# Patient Record
Sex: MALE | Race: WHITE | NOT HISPANIC OR LATINO | Employment: OTHER | RURAL
[De-identification: names, ages, dates, MRNs, and addresses within clinical notes are randomized per-mention and may not be internally consistent; named-entity substitution may affect disease eponyms.]

---

## 2022-05-13 ENCOUNTER — OFFICE VISIT (OUTPATIENT)
Dept: FAMILY MEDICINE CLINIC | Facility: CLINIC | Age: 77
End: 2022-05-13

## 2022-05-13 VITALS
BODY MASS INDEX: 33.05 KG/M2 | HEIGHT: 72 IN | HEART RATE: 47 BPM | SYSTOLIC BLOOD PRESSURE: 132 MMHG | OXYGEN SATURATION: 96 % | WEIGHT: 244 LBS | DIASTOLIC BLOOD PRESSURE: 78 MMHG

## 2022-05-13 DIAGNOSIS — Z12.2 ENCOUNTER FOR SCREENING FOR LUNG CANCER: ICD-10-CM

## 2022-05-13 DIAGNOSIS — F17.210 CIGARETTE NICOTINE DEPENDENCE WITHOUT COMPLICATION: ICD-10-CM

## 2022-05-13 DIAGNOSIS — Z79.899 ENCOUNTER FOR LONG-TERM (CURRENT) USE OF OTHER MEDICATIONS: ICD-10-CM

## 2022-05-13 DIAGNOSIS — N40.1 BENIGN PROSTATIC HYPERPLASIA WITH NOCTURIA: ICD-10-CM

## 2022-05-13 DIAGNOSIS — Z86.010 H/O ADENOMATOUS POLYP OF COLON: ICD-10-CM

## 2022-05-13 DIAGNOSIS — R53.83 FATIGUE, UNSPECIFIED TYPE: ICD-10-CM

## 2022-05-13 DIAGNOSIS — E55.9 VITAMIN D DEFICIENCY: ICD-10-CM

## 2022-05-13 DIAGNOSIS — F17.200 TOBACCO USE DISORDER: ICD-10-CM

## 2022-05-13 DIAGNOSIS — R73.03 PREDIABETES: ICD-10-CM

## 2022-05-13 DIAGNOSIS — Z12.5 PROSTATE CANCER SCREENING: ICD-10-CM

## 2022-05-13 DIAGNOSIS — M15.9 PRIMARY OSTEOARTHRITIS INVOLVING MULTIPLE JOINTS: ICD-10-CM

## 2022-05-13 DIAGNOSIS — G47.33 OBSTRUCTIVE SLEEP APNEA: ICD-10-CM

## 2022-05-13 DIAGNOSIS — N62 GYNECOMASTIA: ICD-10-CM

## 2022-05-13 DIAGNOSIS — H61.23 BILATERAL IMPACTED CERUMEN: ICD-10-CM

## 2022-05-13 DIAGNOSIS — R35.1 BENIGN PROSTATIC HYPERPLASIA WITH NOCTURIA: ICD-10-CM

## 2022-05-13 DIAGNOSIS — E78.2 MIXED HYPERLIPIDEMIA: Primary | ICD-10-CM

## 2022-05-13 DIAGNOSIS — D51.0 PERNICIOUS ANEMIA: ICD-10-CM

## 2022-05-13 PROBLEM — Z86.0101 H/O ADENOMATOUS POLYP OF COLON: Status: ACTIVE | Noted: 2022-05-13

## 2022-05-13 PROBLEM — M15.0 PRIMARY OSTEOARTHRITIS INVOLVING MULTIPLE JOINTS: Status: ACTIVE | Noted: 2022-05-13

## 2022-05-13 PROCEDURE — 99214 OFFICE O/P EST MOD 30 MIN: CPT | Performed by: FAMILY MEDICINE

## 2022-05-13 RX ORDER — UBIDECARENONE 100 MG
100 CAPSULE ORAL DAILY
COMMUNITY

## 2022-05-13 RX ORDER — UBIDECARENONE 75 MG
CAPSULE ORAL
COMMUNITY

## 2022-05-13 RX ORDER — ATORVASTATIN CALCIUM 20 MG/1
20 TABLET, FILM COATED ORAL DAILY
Qty: 90 TABLET | Refills: 3 | Status: SHIPPED | OUTPATIENT
Start: 2022-05-13 | End: 2022-09-26

## 2022-05-13 RX ORDER — ATORVASTATIN CALCIUM 20 MG/1
20 TABLET, FILM COATED ORAL DAILY
COMMUNITY
Start: 2022-05-09 | End: 2022-05-13 | Stop reason: SDUPTHER

## 2022-05-13 RX ORDER — ERGOCALCIFEROL 1.25 MG/1
CAPSULE ORAL
COMMUNITY

## 2022-05-14 PROBLEM — N62 GYNECOMASTIA: Status: ACTIVE | Noted: 2022-05-14

## 2022-05-15 NOTE — PROGRESS NOTES
"Chief Complaint  Hyperlipidemia    Subjective      Shaw Rodriguez presents to [facility]  History of Present Illness  Patient is here for follow-up on hyperlipidemia as well as several other medical issues.  He typically comes in once a year.  Patient says he did have a cardiac evaluation, and that everything was okay, but he does not recall having a stress test.  He was diagnosed with sleep apnea and is using his CPAP and benefiting from this.  He had COVID last December, and still gets a little bit short of breath more easily than he used to, but is getting better.  Patient also had a colonoscopy a month ago and had 5 polyps removed along with mild diverticulosis seen, and we will need to work on getting records of the cardiac evaluation and the colonoscopy.  The patient will be due for a screening CT scan of the chest in 3 months.  His extensive smoking history has been documented previously in the chart.    The only new complaint he has today is that he has some mild gynecomastia with tenderness in the breast bilaterally.  He says they really do not hurt unless pressure is applied, and he has not felt any lumps or masses, no axillary adenopathy, no skin changes and no nipple discharge.    Objective   Vital Signs:   Vitals:    05/13/22 1328   BP: 132/78   BP Location: Left arm   Patient Position: Sitting   Cuff Size: Large Adult   Pulse: (!) 47   SpO2: 96%   Weight: 111 kg (244 lb)   Height: 182.9 cm (72\")      /78 (BP Location: Left arm, Patient Position: Sitting, Cuff Size: Large Adult)   Pulse (!) 47   Ht 182.9 cm (72\")   Wt 111 kg (244 lb)   SpO2 96%   BMI 33.09 kg/m²     Body mass index is 33.09 kg/m².    Review of Systems   Constitutional: Negative for chills, diaphoresis, fever, unexpected weight gain and unexpected weight loss.   HENT: Negative for ear discharge, ear pain, mouth sores, nosebleeds, rhinorrhea, sinus pressure, sore throat, swollen glands, trouble " swallowing and voice change.    Eyes: Negative for blurred vision, double vision, pain, redness and visual disturbance.   Respiratory: Negative for cough, chest tightness, shortness of breath (Mild with exertion), wheezing and stridor.    Cardiovascular: Negative for chest pain, palpitations and leg swelling.        PND, orthopnea   Gastrointestinal: Negative for abdominal distention, abdominal pain, anal bleeding, blood in stool, constipation, diarrhea, nausea, rectal pain, vomiting, GERD and indigestion.        Dysphagia, odynophagia   Endocrine: Negative for polydipsia, polyphagia and polyuria.   Genitourinary: Positive for nocturia. Negative for breast discharge, dysuria, hematuria and urinary incontinence.   Musculoskeletal: Negative for arthralgias (unusual/atypica), back pain, gait problem, joint swelling, myalgias and neck pain.   Skin: Negative for rash, skin lesions (worrisome/suspicious) and bruise.   Allergic/Immunologic: Negative for food allergies.   Neurological: Negative for dizziness, tremors, seizures, syncope, facial asymmetry, speech difficulty, weakness, light-headedness, numbness, headache and memory problem.   Hematological: Negative for adenopathy. Does not bruise/bleed easily.   Psychiatric/Behavioral: Negative for suicidal ideas, depressed mood and stress. The patient is not nervous/anxious.        Past History:  Medical History: has a past medical history of Actinic keratosis, Allergic rhinitis, Appendicitis, B12 deficiency, Basal cell carcinoma, Colon polyp, Cystic dysplasia of one kidney, Degenerative joint disease involving multiple joints, Hepatitis B, Hyperplastic colon polyp (2005), Mixed hyperlipidemia, Nasal polyps, Nicotine dependence, Obstructive sleep apnea, On long term drug therapy, Onychomycosis, Phlebitis (2004), Prediabetes, Right medial knee pain, Thromboembolic EVENT (HCC), Tinea pedis, Varicose veins of both lower extremities, and Vitamin D deficiency.   Surgical  History: has a past surgical history that includes Hernia repair (Left); Colonoscopy (2005); Polypectomy (08/2017); Excision basal cell carcinoma; Appendectomy; Breast fibroadenoma surgery; Vasectomy; Other surgical history; and Colonoscopy w/ biopsies and polypectomy.   Family History: family history includes Alzheimer's disease in his mother; Asthma in his daughter; Coronary artery disease in his mother; Diabetes in his mother; Heart attack in his father; Hyperlipidemia in his brother; Hypertension in his mother; Peripheral vascular disease in his mother.   Social History: reports that he quit smoking about 2 years ago. His smoking use included cigars and pipe. He has never used smokeless tobacco. He reports that he does not drink alcohol and does not use drugs.      Current Outpatient Medications:   •  atorvastatin (LIPITOR) 20 MG tablet, Take 1 tablet by mouth Daily., Disp: 90 tablet, Rfl: 3  •  coenzyme Q10 100 MG capsule, Take 100 mg by mouth Daily., Disp: , Rfl:   •  ergocalciferol (ERGOCALCIFEROL) 1.25 MG (16321 UT) capsule, Vitamin D2 1,250 mcg (50,000 unit) capsule, Disp: , Rfl:   •  vitamin B-12 (CYANOCOBALAMIN) 100 MCG tablet, Vitamin B12, Disp: , Rfl:     Allergies: Patient has no known allergies.    Physical Exam  Constitutional:       General: He is not in acute distress.     Appearance: He is obese. He is not toxic-appearing.   HENT:      Head: Normocephalic and atraumatic.      Right Ear: Tympanic membrane, ear canal and external ear normal. There is impacted cerumen.      Left Ear: Tympanic membrane, ear canal and external ear normal. There is impacted cerumen.      Nose: Nose normal. No rhinorrhea.      Mouth/Throat:      Mouth: Mucous membranes are moist.      Pharynx: Oropharynx is clear. No posterior oropharyngeal erythema.   Eyes:      General: No scleral icterus.     Extraocular Movements: Extraocular movements intact.      Conjunctiva/sclera: Conjunctivae normal.      Pupils: Pupils are  equal, round, and reactive to light.   Neck:      Vascular: No carotid bruit.   Cardiovascular:      Rate and Rhythm: Normal rate and regular rhythm.      Pulses: Normal pulses.      Heart sounds: Normal heart sounds. No murmur heard.    No gallop.   Pulmonary:      Effort: Pulmonary effort is normal.      Breath sounds: Normal breath sounds. No wheezing, rhonchi or rales.   Chest:      Chest wall: No tenderness.   Breasts:      Right: Swelling and tenderness present. No bleeding, inverted nipple, mass, nipple discharge, skin change or axillary adenopathy.      Left: Swelling and tenderness present. No bleeding, inverted nipple, mass, nipple discharge, skin change or axillary adenopathy.        Comments: Evidence of mild gynecomastia bilaterally with some tenderness  Abdominal:      General: Bowel sounds are normal. There is no distension.      Palpations: Abdomen is soft. There is no mass.      Tenderness: There is no abdominal tenderness. There is no guarding or rebound.   Musculoskeletal:         General: No swelling, tenderness or deformity. Normal range of motion.      Cervical back: No rigidity.      Right lower leg: No edema.      Left lower leg: No edema.   Lymphadenopathy:      Cervical: No cervical adenopathy.      Upper Body:      Right upper body: No axillary or pectoral adenopathy.      Left upper body: No axillary or pectoral adenopathy.   Skin:     General: Skin is warm and dry.      Capillary Refill: Capillary refill takes less than 2 seconds.      Coloration: Skin is not pale.      Findings: No erythema or rash.   Neurological:      General: No focal deficit present.      Mental Status: He is alert and oriented to person, place, and time.      Cranial Nerves: No cranial nerve deficit.      Motor: No weakness.      Coordination: Coordination normal.      Gait: Gait normal.   Psychiatric:         Mood and Affect: Mood normal.         Behavior: Behavior normal.         Thought Content: Thought content  normal.         Judgment: Judgment normal.          Result Review :                  Assessment and Plan   Diagnoses and all orders for this visit:    1. Mixed hyperlipidemia (Primary)  -     Lipid Panel; Future  Patient will continue current medications, and no changes in his therapy are needed at this time.  He will will be scheduled for his screening CT of the chest on or after September 1 of this year.  We will work on getting records from cardiology and Dr. Mane who did his colonoscopy.  If he genuinely did not have any type of stress test, and he continues to note shortness of breath with exertion, then he should contact a cardiologist and go back and insist on this test being done.  I did praise him for good compliance with his CPAP, and he is benefiting from this.  2. Encounter for long-term (current) use of other medications  -     CBC Auto Differential; Future  -     Comprehensive Metabolic Panel; Future    3. Fatigue, unspecified type  -     TSH; Future    4. Tobacco use disorder    5. Cigarette nicotine dependence without complication  -     CT Chest Low Dose Wo; Future    6. Encounter for screening for lung cancer    7. H/O adenomatous polyp of colon    8. Primary osteoarthritis involving multiple joints    9. Pernicious anemia  -     Vitamin B12; Future    10. Benign prostatic hyperplasia with nocturia    11. Prediabetes  -     Hemoglobin A1c; Future    12. Prostate cancer screening  -     PSA Screen; Future    13. Vitamin D deficiency  -     Vitamin D 25 Hydroxy; Future    14. Bilateral impacted cerumen  Ears were irrigated to clear  15. Obstructive sleep apnea    16. Gynecomastia  I did explain to the patient that breast tenderness is usually an indication for doing diagnostic mammography.  However, it is fairly common for men his age to have some degree of gynecomastia.  Therefore, he declines to do the mammography at this time, and promises that he will continue to self monitor and let me know  if any thing worrisome or suspicious develops.  He insists that the discomfort is only present when there is palpation of the breast tissue, but if it worsens or changes he will let me know.  He will also watch for any adenopathy.  If all goes well I will see him back in 1 year or sooner if needed  Other orders  -     atorvastatin (LIPITOR) 20 MG tablet; Take 1 tablet by mouth Daily.  Dispense: 90 tablet; Refill: 3                 Follow Up   Return in about 1 year (around 5/13/2023) for Recheck.  Patient was given instructions and counseling regarding his condition or for health maintenance advice. Please see specific information pulled into the AVS if appropriate.     Bakari Monge MD

## 2022-06-30 ENCOUNTER — LAB (OUTPATIENT)
Dept: FAMILY MEDICINE CLINIC | Facility: CLINIC | Age: 77
End: 2022-06-30

## 2022-06-30 DIAGNOSIS — R73.03 PREDIABETES: ICD-10-CM

## 2022-06-30 DIAGNOSIS — Z12.5 PROSTATE CANCER SCREENING: ICD-10-CM

## 2022-06-30 DIAGNOSIS — Z79.899 ENCOUNTER FOR LONG-TERM (CURRENT) USE OF OTHER MEDICATIONS: ICD-10-CM

## 2022-06-30 DIAGNOSIS — R53.83 FATIGUE, UNSPECIFIED TYPE: ICD-10-CM

## 2022-06-30 DIAGNOSIS — D51.0 PERNICIOUS ANEMIA: ICD-10-CM

## 2022-06-30 DIAGNOSIS — E55.9 VITAMIN D DEFICIENCY: ICD-10-CM

## 2022-06-30 DIAGNOSIS — E78.2 MIXED HYPERLIPIDEMIA: ICD-10-CM

## 2022-06-30 PROCEDURE — 36415 COLL VENOUS BLD VENIPUNCTURE: CPT | Performed by: FAMILY MEDICINE

## 2022-07-01 LAB
25(OH)D3+25(OH)D2 SERPL-MCNC: 29.9 NG/ML (ref 30–100)
ALBUMIN SERPL-MCNC: 4.1 G/DL (ref 3.7–4.7)
ALBUMIN/GLOB SERPL: 1.7 {RATIO} (ref 1.2–2.2)
ALP SERPL-CCNC: 95 IU/L (ref 44–121)
ALT SERPL-CCNC: 23 IU/L (ref 0–44)
AST SERPL-CCNC: 19 IU/L (ref 0–40)
BASOPHILS # BLD AUTO: 0.1 X10E3/UL (ref 0–0.2)
BASOPHILS NFR BLD AUTO: 1 %
BILIRUB SERPL-MCNC: 1 MG/DL (ref 0–1.2)
BUN SERPL-MCNC: 13 MG/DL (ref 8–27)
BUN/CREAT SERPL: 12 (ref 10–24)
CALCIUM SERPL-MCNC: 10.2 MG/DL (ref 8.6–10.2)
CHLORIDE SERPL-SCNC: 106 MMOL/L (ref 96–106)
CHOLEST SERPL-MCNC: 138 MG/DL (ref 100–199)
CO2 SERPL-SCNC: 23 MMOL/L (ref 20–29)
CREAT SERPL-MCNC: 1.06 MG/DL (ref 0.76–1.27)
EGFRCR SERPLBLD CKD-EPI 2021: 73 ML/MIN/1.73
EOSINOPHIL # BLD AUTO: 0.2 X10E3/UL (ref 0–0.4)
EOSINOPHIL NFR BLD AUTO: 3 %
ERYTHROCYTE [DISTWIDTH] IN BLOOD BY AUTOMATED COUNT: 11.6 % (ref 11.6–15.4)
GLOBULIN SER CALC-MCNC: 2.4 G/DL (ref 1.5–4.5)
GLUCOSE SERPL-MCNC: 86 MG/DL (ref 65–99)
HBA1C MFR BLD: 5.8 % (ref 4.8–5.6)
HCT VFR BLD AUTO: 44.4 % (ref 37.5–51)
HDLC SERPL-MCNC: 45 MG/DL
HGB BLD-MCNC: 14.7 G/DL (ref 13–17.7)
IMM GRANULOCYTES # BLD AUTO: 0 X10E3/UL (ref 0–0.1)
IMM GRANULOCYTES NFR BLD AUTO: 1 %
LDLC SERPL CALC-MCNC: 73 MG/DL (ref 0–99)
LYMPHOCYTES # BLD AUTO: 1.7 X10E3/UL (ref 0.7–3.1)
LYMPHOCYTES NFR BLD AUTO: 28 %
MCH RBC QN AUTO: 32 PG (ref 26.6–33)
MCHC RBC AUTO-ENTMCNC: 33.1 G/DL (ref 31.5–35.7)
MCV RBC AUTO: 97 FL (ref 79–97)
MONOCYTES # BLD AUTO: 0.6 X10E3/UL (ref 0.1–0.9)
MONOCYTES NFR BLD AUTO: 10 %
NEUTROPHILS # BLD AUTO: 3.5 X10E3/UL (ref 1.4–7)
NEUTROPHILS NFR BLD AUTO: 57 %
PLATELET # BLD AUTO: 209 X10E3/UL (ref 150–450)
POTASSIUM SERPL-SCNC: 4.6 MMOL/L (ref 3.5–5.2)
PROT SERPL-MCNC: 6.5 G/DL (ref 6–8.5)
PSA SERPL-MCNC: 2.5 NG/ML (ref 0–4)
RBC # BLD AUTO: 4.59 X10E6/UL (ref 4.14–5.8)
SODIUM SERPL-SCNC: 141 MMOL/L (ref 134–144)
TRIGL SERPL-MCNC: 108 MG/DL (ref 0–149)
TSH SERPL DL<=0.005 MIU/L-ACNC: 2.51 UIU/ML (ref 0.45–4.5)
VIT B12 SERPL-MCNC: 1520 PG/ML (ref 232–1245)
VLDLC SERPL CALC-MCNC: 20 MG/DL (ref 5–40)
WBC # BLD AUTO: 6 X10E3/UL (ref 3.4–10.8)

## 2022-09-26 RX ORDER — ATORVASTATIN CALCIUM 20 MG/1
TABLET, FILM COATED ORAL
Qty: 90 TABLET | Refills: 2 | Status: SHIPPED | OUTPATIENT
Start: 2022-09-26

## 2023-09-25 ENCOUNTER — OFFICE VISIT (OUTPATIENT)
Dept: FAMILY MEDICINE CLINIC | Facility: CLINIC | Age: 78
End: 2023-09-25
Payer: MEDICARE

## 2023-09-25 ENCOUNTER — TELEPHONE (OUTPATIENT)
Dept: FAMILY MEDICINE CLINIC | Facility: CLINIC | Age: 78
End: 2023-09-25

## 2023-09-25 VITALS
HEART RATE: 74 BPM | BODY MASS INDEX: 33.46 KG/M2 | SYSTOLIC BLOOD PRESSURE: 136 MMHG | OXYGEN SATURATION: 99 % | WEIGHT: 247 LBS | DIASTOLIC BLOOD PRESSURE: 86 MMHG | HEIGHT: 72 IN

## 2023-09-25 DIAGNOSIS — F17.200 TOBACCO USE DISORDER: ICD-10-CM

## 2023-09-25 DIAGNOSIS — R03.0 ELEVATED BLOOD PRESSURE READING WITHOUT DIAGNOSIS OF HYPERTENSION: ICD-10-CM

## 2023-09-25 DIAGNOSIS — R35.1 BENIGN PROSTATIC HYPERPLASIA WITH NOCTURIA: ICD-10-CM

## 2023-09-25 DIAGNOSIS — G47.33 OBSTRUCTIVE SLEEP APNEA: ICD-10-CM

## 2023-09-25 DIAGNOSIS — Z86.010 H/O ADENOMATOUS POLYP OF COLON: ICD-10-CM

## 2023-09-25 DIAGNOSIS — E78.2 MIXED HYPERLIPIDEMIA: ICD-10-CM

## 2023-09-25 DIAGNOSIS — R53.83 OTHER FATIGUE: ICD-10-CM

## 2023-09-25 DIAGNOSIS — D51.0 PERNICIOUS ANEMIA: ICD-10-CM

## 2023-09-25 DIAGNOSIS — Z11.59 NEED FOR HEPATITIS C SCREENING TEST: ICD-10-CM

## 2023-09-25 DIAGNOSIS — Z00.01 ENCOUNTER FOR GENERAL ADULT MEDICAL EXAMINATION WITH ABNORMAL FINDINGS: Primary | ICD-10-CM

## 2023-09-25 DIAGNOSIS — M15.9 PRIMARY OSTEOARTHRITIS INVOLVING MULTIPLE JOINTS: ICD-10-CM

## 2023-09-25 DIAGNOSIS — R73.03 PREDIABETES: ICD-10-CM

## 2023-09-25 DIAGNOSIS — Z12.5 PROSTATE CANCER SCREENING: ICD-10-CM

## 2023-09-25 DIAGNOSIS — Z79.899 ENCOUNTER FOR LONG-TERM (CURRENT) USE OF OTHER MEDICATIONS: ICD-10-CM

## 2023-09-25 DIAGNOSIS — E55.9 VITAMIN D DEFICIENCY: ICD-10-CM

## 2023-09-25 DIAGNOSIS — F17.210 CIGARETTE NICOTINE DEPENDENCE WITHOUT COMPLICATION: ICD-10-CM

## 2023-09-25 DIAGNOSIS — N40.1 BENIGN PROSTATIC HYPERPLASIA WITH NOCTURIA: ICD-10-CM

## 2023-09-25 RX ORDER — ATORVASTATIN CALCIUM 20 MG/1
20 TABLET, FILM COATED ORAL DAILY
Qty: 90 TABLET | Refills: 3 | Status: SHIPPED | OUTPATIENT
Start: 2023-09-25

## 2023-09-25 NOTE — PROGRESS NOTES
The ABCs of the Annual Wellness Visit  Subsequent Medicare Wellness Visit    Subjective    Shaw Rodriguez is a 77 y.o. male who presents for a Subsequent Medicare Wellness Visit.    The following portions of the patient's history were reviewed and   updated as appropriate: allergies, current medications, past family history, past medical history, past social history, past surgical history, and problem list.    Compared to one year ago, the patient feels his physical   health is the same.    Compared to one year ago, the patient feels his mental   health is the same.    Recent Hospitalizations:  He was not admitted to the hospital during the last year.       Current Medical Providers:  Patient Care Team:  Bakari Monge MD as PCP - General (Family Medicine)    Outpatient Medications Prior to Visit   Medication Sig Dispense Refill    coenzyme Q10 100 MG capsule Take 1 capsule by mouth Daily.      ergocalciferol (ERGOCALCIFEROL) 1.25 MG (00409 UT) capsule Vitamin D2 1,250 mcg (50,000 unit) capsule      vitamin B-12 (CYANOCOBALAMIN) 100 MCG tablet Vitamin B12      atorvastatin (LIPITOR) 20 MG tablet TAKE 1 TABLET BY MOUTH EVERY DAY 90 tablet 2     No facility-administered medications prior to visit.       No opioid medication identified on active medication list. I have reviewed chart for other potential  high risk medication/s and harmful drug interactions in the elderly.        Aspirin is not on active medication list.  Aspirin use is not indicated based on review of current medical condition/s. Risk of harm outweighs potential benefits.  .    Patient Active Problem List   Diagnosis    Mixed hyperlipidemia    Encounter for long-term (current) use of other medications    Tobacco use disorder    Cigarette nicotine dependence without complication    H/O adenomatous polyp of colon    Primary osteoarthritis involving multiple joints    Pernicious anemia    Benign prostatic hyperplasia with nocturia    Prediabetes     "Obstructive sleep apnea    Gynecomastia     Advance Care Planning   Advance Care Planning     Advance Directive is not on file.  ACP discussion was held with the patient during this visit. Patient does not have an advance directive, information provided.     Objective    Vitals:    23 1331 23 1427   BP: 150/88 136/86   BP Location: Left arm Left arm   Patient Position: Sitting Sitting   Cuff Size: Adult Adult   Pulse: 74    SpO2: 99%    Weight: 112 kg (247 lb)    Height: 182.9 cm (72\")      Estimated body mass index is 33.5 kg/m² as calculated from the following:    Height as of this encounter: 182.9 cm (72\").    Weight as of this encounter: 112 kg (247 lb).    BMI is >= 30 and <35. (Class 1 Obesity). The following options were offered after discussion;: weight loss educational material (shared in after visit summary)      Does the patient have evidence of cognitive impairment? No          HEALTH RISK ASSESSMENT    Smoking Status:  Social History     Tobacco Use   Smoking Status Former    Types: Cigars, Pipe    Quit date:     Years since quitting: 3.7   Smokeless Tobacco Never     Alcohol Consumption:  Social History     Substance and Sexual Activity   Alcohol Use Never     Fall Risk Screen:    STEADI Fall Risk Assessment was completed, and patient is at MODERATE risk for falls. Assessment completed on:2023    Depression Screenin/25/2023     1:35 PM   PHQ-2/PHQ-9 Depression Screening   Little Interest or Pleasure in Doing Things 0-->not at all   Feeling Down, Depressed or Hopeless 0-->not at all   PHQ-9: Brief Depression Severity Measure Score 0       Health Habits and Functional and Cognitive Screenin/25/2023     1:32 PM   Functional & Cognitive Status   Do you have difficulty preparing food and eating? No   Do you have difficulty bathing yourself, getting dressed or grooming yourself? No   Do you have difficulty using the toilet? No   Do you have difficulty moving around from " place to place? No   Do you have trouble with steps or getting out of a bed or a chair? Yes   Current Diet Well Balanced Diet   Dental Exam Up to date   Eye Exam Up to date   Exercise (times per week) 7 times per week   Current Exercises Include Walking   Do you need help using the phone?  No   Are you deaf or do you have serious difficulty hearing?  Yes   Do you need help to go to places out of walking distance? No   Do you need help shopping? No   Do you need help preparing meals?  No   Do you need help with housework?  No   Do you need help with laundry? No   Do you need help taking your medications? No   Do you need help managing money? No   Do you ever drive or ride in a car without wearing a seat belt? No   Have you felt unusual stress, anger or loneliness in the last month? No   Who do you live with? Spouse   If you need help, do you have trouble finding someone available to you? No   Have you been bothered in the last four weeks by sexual problems? No   Do you have difficulty concentrating, remembering or making decisions? No       Age-appropriate Screening Schedule:  Refer to the list below for future screening recommendations based on patient's age, sex and/or medical conditions. Orders for these recommended tests are listed in the plan section. The patient has been provided with a written plan.    Health Maintenance   Topic Date Due    BMI FOLLOWUP  Never done    Pneumococcal Vaccine 65+ (1 - PCV) 11/02/1951    ZOSTER VACCINE (1 of 2) Never done    TDAP/TD VACCINES (2 - Tdap) 01/26/2015    COVID-19 Vaccine (4 - Moderna series) 01/03/2022    HEPATITIS C SCREENING  Never done    ANNUAL WELLNESS VISIT  05/05/2022    LIPID PANEL  06/30/2023    INFLUENZA VACCINE  10/01/2023    COLORECTAL CANCER SCREENING  Discontinued                  CMS Preventative Services Quick Reference  Risk Factors Identified During Encounter  None Identified  The above risks/problems have been discussed with the patient.  Pertinent  information has been shared with the patient in the After Visit Summary.  An After Visit Summary and PPPS were made available to the patient.    Follow Up:   Next Medicare Wellness visit to be scheduled in 1 year.       Additional E&M Note during same encounter follows:  Patient has multiple medical problems which are significant and separately identifiable that require additional work above and beyond the Medicare Wellness Visit.      Chief Complaint  Medicare Wellness-subsequent    Subjective        HPI  Shaw Rodriguez is also being seen today for checkup on hyperlipidemia and a few other issues. Pt has been feeling well overall. He did have a URI a few weeks ago that felt very similar to previous COVID illness, but has recovered fully with no sequellae. He has had COVID vaccine x 3 and we did discuss importance of getting yearly booster, along with RSV vaccine ,Zoster vaccine, and prevnar 20 and high dose flu shot which he is getting here today. Pt quit smoking in late 201`9 or early 2020 so he is due for lung cancer screening again, last one was 6/2022, no sx of lung CA. Pt is using CPAP regularly and benefits from this. Has not had any CV sx lately with exertion, though he did have a bit of brief chest discomfort upon lying down a couple of weeks ago, but no symptoms with walking 1.5 miles a day, no GERD sx or swallowing problems. Cardiac workup about 1.5yrs ago was normal.     Review of Systems   Constitutional:  Negative for activity change, appetite change, chills and fever.   HENT:  Negative for sore throat and trouble swallowing.    Eyes:  Negative for visual disturbance.   Respiratory:  Negative for cough, choking, shortness of breath and wheezing.    Cardiovascular:  Negative for chest pain, palpitations and leg swelling.   Gastrointestinal:  Negative for abdominal pain, blood in stool, constipation, diarrhea and vomiting.   Endocrine: Negative for polydipsia, polyphagia and polyuria.   Genitourinary:   "Negative for dysuria and hematuria.   Musculoskeletal:  Negative for arthralgias, back pain, gait problem and joint swelling.   Skin:  Negative for rash.   Allergic/Immunologic: Positive for environmental allergies.   Neurological:  Negative for tremors, seizures, syncope, speech difficulty, weakness and light-headedness.   Hematological:  Negative for adenopathy.   Psychiatric/Behavioral:  Negative for dysphoric mood. The patient is not nervous/anxious.      Objective   Vital Signs:  /86 (BP Location: Left arm, Patient Position: Sitting, Cuff Size: Adult)   Pulse 74   Ht 182.9 cm (72\")   Wt 112 kg (247 lb)   SpO2 99%   BMI 33.50 kg/m²     Physical Exam  Constitutional:       General: He is not in acute distress.     Appearance: He is obese. He is not toxic-appearing.   HENT:      Head: Normocephalic and atraumatic.      Right Ear: Ear canal and external ear normal.      Left Ear: Ear canal and external ear normal.      Nose: Nose normal.      Mouth/Throat:      Mouth: Mucous membranes are moist.      Pharynx: Oropharynx is clear.   Eyes:      General: No scleral icterus.     Extraocular Movements: Extraocular movements intact.      Conjunctiva/sclera: Conjunctivae normal.      Pupils: Pupils are equal, round, and reactive to light.   Neck:      Vascular: No carotid bruit.   Cardiovascular:      Rate and Rhythm: Normal rate and regular rhythm.      Pulses: Normal pulses.      Heart sounds: Normal heart sounds.   Pulmonary:      Effort: Pulmonary effort is normal.      Breath sounds: Normal breath sounds.   Chest:      Chest wall: No tenderness.   Abdominal:      General: Bowel sounds are normal. There is no distension.      Palpations: Abdomen is soft. There is no mass.      Tenderness: There is no abdominal tenderness. There is no guarding or rebound.   Musculoskeletal:         General: No swelling or deformity. Normal range of motion.      Cervical back: Normal range of motion. No rigidity.      Right " lower leg: No edema.      Left lower leg: No edema.   Lymphadenopathy:      Cervical: No cervical adenopathy.   Skin:     General: Skin is warm and dry.      Capillary Refill: Capillary refill takes less than 2 seconds.      Coloration: Skin is not pale.      Findings: No erythema or rash.   Neurological:      General: No focal deficit present.      Mental Status: He is alert and oriented to person, place, and time.      Cranial Nerves: No cranial nerve deficit.      Motor: No weakness.      Coordination: Coordination normal.      Gait: Gait normal.   Psychiatric:         Mood and Affect: Mood normal.         Behavior: Behavior normal.         Thought Content: Thought content normal.         Judgment: Judgment normal.                       Assessment and Plan   Diagnoses and all orders for this visit:    1. Encounter for general adult medical examination with abnormal findings (Primary)  This was diagnosis used for the wellness portion of the visit  2. Mixed hyperlipidemia  -     Lipid Panel  Patient will continue atorvastatin and we will check labs  3. Encounter for long-term (current) use of other medications  -     CBC & Differential  -     Comprehensive Metabolic Panel    4. Tobacco use disorder  -     CT Chest Low Dose Wo; Future  Patient praised for remaining off of tobacco for the last 3 and half years and we will get low-dose CT scheduled  5. Cigarette nicotine dependence without complication  -     CT Chest Low Dose Wo; Future    6. H/O adenomatous polyp of colon  Last colonoscopy reported early 2022, but unfortunately we never got the report, so we will obtain and help patient make sure that he has a timely follow-up, which will probably be due in 2015 since he had 5 polyps (but I am awaiting report and pathology)  7. Primary osteoarthritis involving multiple joints    8. Pernicious anemia  -     Vitamin B12  -     Folate    9. Benign prostatic hyperplasia with nocturia    10. Obstructive sleep  apnea  Patient is using his CPAP and benefits from this  11. Prediabetes  -     Hemoglobin A1c  Patient eats appropriately and exercises daily but unfortunately has not been able to lose any weight.  We discussed that most patients with prediabetes have insulin resistance and that makes weight loss more difficult.  He does not wish to take any additional medication at this time but will continue making efforts at living a healthy lifestyle, and if his weight becomes more problematic and he decides to try some weight loss medications he will let me know  12. Need for hepatitis C screening test  -     Hepatitis C Antibody    13. Prostate cancer screening  -     PSA Screen    14. Vitamin D deficiency  -     Vitamin D,25-Hydroxy    15. Elevated blood pressure reading without diagnosis of hypertension  Blood pressure has historically been good, so he will monitor at home at least 2-3 times a month and if it goes to 140/90 or higher consistently he will let me know  16. Other fatigue  -     TSH+Free T4  Patient will be given high-dose flu vaccine and Prevnar 20 today, and he will get the RSV vaccine at the pharmacy in a couple of weeks, and I recommended that he get the COVID booster and the Shingrix vaccines at the pharmacy as well  Other orders  -     Fluzone High-Dose 65+yrs (4808-3532)  -     Pneumococcal Conjugate Vaccine 20-Valent (PCV20)  -     RSVPreF3 Vac Recomb Adjuvanted (AREXVY) 120 MCG/0.5ML reconstituted suspension injection; Inject 0.5 mL into the appropriate muscle as directed by prescriber 1 (One) Time for 1 dose.  Dispense: 0.5 mL; Refill: 0  -     atorvastatin (LIPITOR) 20 MG tablet; Take 1 tablet by mouth Daily.  Dispense: 90 tablet; Refill: 3             Follow Up   Return in about 1 year (around 9/25/2024) for Recheck, Nurse - AWV Subsequent.  Patient was given instructions and counseling regarding his condition or for health maintenance advice. Please see specific information pulled into the AVS if  appropriate.

## 2023-09-25 NOTE — TELEPHONE ENCOUNTER
Pt had colonoscopy with someone at [facility] office in [place] last year, at [facility] but we never got report, and need that report please + pathology on polyps, AND need to know when next scope is due (he had 5 polyps, so usually it's 3 years, but depends on pathology report from polyps)

## 2023-09-25 NOTE — TELEPHONE ENCOUNTER
Pt had colonoscopy with someone at [facility] in [place], [place] [facility] [place] but we never got report, and need that report please + pathology on polyps, AND need to know when next scope is due (he had 5 polyps, so usually it's 3 years, but depends on pathology report from polyps)      Dr. Monge last colonscopy was done 08/15/2017 was not complete  have path report attached in to do box

## 2023-09-26 DIAGNOSIS — Z79.899 ENCOUNTER FOR LONG-TERM (CURRENT) USE OF OTHER MEDICATIONS: ICD-10-CM

## 2023-09-26 DIAGNOSIS — R97.20 ELEVATED PSA: Primary | ICD-10-CM

## 2023-09-26 LAB
25(OH)D3+25(OH)D2 SERPL-MCNC: 29.4 NG/ML (ref 30–100)
ALBUMIN SERPL-MCNC: 4.3 G/DL (ref 3.8–4.8)
ALBUMIN/GLOB SERPL: 2 {RATIO} (ref 1.2–2.2)
ALP SERPL-CCNC: 81 IU/L (ref 44–121)
ALT SERPL-CCNC: 31 IU/L (ref 0–44)
AST SERPL-CCNC: 24 IU/L (ref 0–40)
BASOPHILS # BLD AUTO: 0.1 X10E3/UL (ref 0–0.2)
BASOPHILS NFR BLD AUTO: 1 %
BILIRUB SERPL-MCNC: 1.4 MG/DL (ref 0–1.2)
BUN SERPL-MCNC: 17 MG/DL (ref 8–27)
BUN/CREAT SERPL: 16 (ref 10–24)
CALCIUM SERPL-MCNC: 10.4 MG/DL (ref 8.6–10.2)
CHLORIDE SERPL-SCNC: 104 MMOL/L (ref 96–106)
CHOLEST SERPL-MCNC: 135 MG/DL (ref 100–199)
CO2 SERPL-SCNC: 24 MMOL/L (ref 20–29)
CREAT SERPL-MCNC: 1.05 MG/DL (ref 0.76–1.27)
EGFRCR SERPLBLD CKD-EPI 2021: 74 ML/MIN/1.73
EOSINOPHIL # BLD AUTO: 0.1 X10E3/UL (ref 0–0.4)
EOSINOPHIL NFR BLD AUTO: 2 %
ERYTHROCYTE [DISTWIDTH] IN BLOOD BY AUTOMATED COUNT: 11.5 % (ref 11.6–15.4)
FOLATE SERPL-MCNC: 8.9 NG/ML
GLOBULIN SER CALC-MCNC: 2.2 G/DL (ref 1.5–4.5)
GLUCOSE SERPL-MCNC: 84 MG/DL (ref 70–99)
HBA1C MFR BLD: 6 % (ref 4.8–5.6)
HCT VFR BLD AUTO: 44.3 % (ref 37.5–51)
HCV IGG SERPL QL IA: NON REACTIVE
HDLC SERPL-MCNC: 41 MG/DL
HGB BLD-MCNC: 15.3 G/DL (ref 13–17.7)
IMM GRANULOCYTES # BLD AUTO: 0 X10E3/UL (ref 0–0.1)
IMM GRANULOCYTES NFR BLD AUTO: 0 %
LDLC SERPL CALC-MCNC: 76 MG/DL (ref 0–99)
LYMPHOCYTES # BLD AUTO: 1.6 X10E3/UL (ref 0.7–3.1)
LYMPHOCYTES NFR BLD AUTO: 26 %
MCH RBC QN AUTO: 33.3 PG (ref 26.6–33)
MCHC RBC AUTO-ENTMCNC: 34.5 G/DL (ref 31.5–35.7)
MCV RBC AUTO: 97 FL (ref 79–97)
MONOCYTES # BLD AUTO: 0.7 X10E3/UL (ref 0.1–0.9)
MONOCYTES NFR BLD AUTO: 11 %
NEUTROPHILS # BLD AUTO: 3.6 X10E3/UL (ref 1.4–7)
NEUTROPHILS NFR BLD AUTO: 60 %
PLATELET # BLD AUTO: 254 X10E3/UL (ref 150–450)
POTASSIUM SERPL-SCNC: 4.4 MMOL/L (ref 3.5–5.2)
PROT SERPL-MCNC: 6.5 G/DL (ref 6–8.5)
PSA SERPL-MCNC: 4.4 NG/ML (ref 0–4)
RBC # BLD AUTO: 4.59 X10E6/UL (ref 4.14–5.8)
SODIUM SERPL-SCNC: 142 MMOL/L (ref 134–144)
T4 FREE SERPL-MCNC: 1.47 NG/DL (ref 0.82–1.77)
TRIGL SERPL-MCNC: 98 MG/DL (ref 0–149)
TSH SERPL DL<=0.005 MIU/L-ACNC: 1.58 UIU/ML (ref 0.45–4.5)
VIT B12 SERPL-MCNC: 1448 PG/ML (ref 232–1245)
VLDLC SERPL CALC-MCNC: 18 MG/DL (ref 5–40)
WBC # BLD AUTO: 6.1 X10E3/UL (ref 3.4–10.8)

## 2023-09-26 NOTE — TELEPHONE ENCOUNTER
Please call pt for clarification. When he came to see me for yearly exam last June of 2022, he told me he had had colonoscopy THAT YEAR, earlier in the year. At least, that is what I understood him to say, which is what I entered in my note--so unless I misunderstood what he meant?? I suspect that something is missing, so I don't know if you called [facility] or looked on [facility] computer system, but please call pt and tell him that one of those sources indicated that his last scope was 8/2017, and see if we cannot get this cleared up. If it is determined that that WAS the last time--then he is 3 years over due. So let me know!        Dr. Monge called [facility] 04/26/2022 they are faxing report with path now will be in to do box

## 2023-12-05 ENCOUNTER — TELEPHONE (OUTPATIENT)
Dept: FAMILY MEDICINE CLINIC | Facility: CLINIC | Age: 78
End: 2023-12-05
Payer: MEDICARE

## 2023-12-05 NOTE — TELEPHONE ENCOUNTER
Caller: Shaw Rodriguez    Relationship to patient: Self    Best call back number: [phone]    Chief complaint: NEEDS LAB APPOINTMENT    Type of visit: LAB    Requested date: TOMORROW         Additional notes:PLEASE CALL TO SCHEDULE

## 2023-12-06 NOTE — TELEPHONE ENCOUNTER
Called patient and was able to schedule him a lab appointment in January like the patient requested.

## 2023-12-21 RX ORDER — ATORVASTATIN CALCIUM 20 MG/1
20 TABLET, FILM COATED ORAL DAILY
Qty: 90 TABLET | Refills: 3 | OUTPATIENT
Start: 2023-12-21

## 2024-01-11 ENCOUNTER — LAB (OUTPATIENT)
Dept: FAMILY MEDICINE CLINIC | Facility: CLINIC | Age: 79
End: 2024-01-11
Payer: MEDICARE

## 2024-10-10 ENCOUNTER — PATIENT OUTREACH (OUTPATIENT)
Dept: OTHER | Facility: HOSPITAL | Age: 79
End: 2024-10-10
Payer: MEDICARE

## 2024-10-10 RX ORDER — ATORVASTATIN CALCIUM 20 MG/1
20 TABLET, FILM COATED ORAL DAILY
Qty: 90 TABLET | Refills: 0 | Status: SHIPPED | OUTPATIENT
Start: 2024-10-10

## 2024-11-18 ENCOUNTER — TELEPHONE (OUTPATIENT)
Dept: FAMILY MEDICINE CLINIC | Facility: CLINIC | Age: 79
End: 2024-11-18

## 2024-11-18 ENCOUNTER — OFFICE VISIT (OUTPATIENT)
Dept: FAMILY MEDICINE CLINIC | Facility: CLINIC | Age: 79
End: 2024-11-18
Payer: MEDICARE

## 2024-11-18 VITALS
BODY MASS INDEX: 35 KG/M2 | SYSTOLIC BLOOD PRESSURE: 124 MMHG | DIASTOLIC BLOOD PRESSURE: 62 MMHG | HEIGHT: 72 IN | HEART RATE: 63 BPM | OXYGEN SATURATION: 95 % | WEIGHT: 258.4 LBS

## 2024-11-18 DIAGNOSIS — E83.52 HYPERCALCEMIA: ICD-10-CM

## 2024-11-18 DIAGNOSIS — E78.2 MIXED HYPERLIPIDEMIA: ICD-10-CM

## 2024-11-18 DIAGNOSIS — G47.33 OBSTRUCTIVE SLEEP APNEA: ICD-10-CM

## 2024-11-18 DIAGNOSIS — E55.9 VITAMIN D INSUFFICIENCY: ICD-10-CM

## 2024-11-18 DIAGNOSIS — H61.23 BILATERAL IMPACTED CERUMEN: ICD-10-CM

## 2024-11-18 DIAGNOSIS — Z86.0101 H/O ADENOMATOUS POLYP OF COLON: ICD-10-CM

## 2024-11-18 DIAGNOSIS — D51.0 PERNICIOUS ANEMIA: ICD-10-CM

## 2024-11-18 DIAGNOSIS — F17.200 TOBACCO USE DISORDER: ICD-10-CM

## 2024-11-18 DIAGNOSIS — M15.0 PRIMARY OSTEOARTHRITIS INVOLVING MULTIPLE JOINTS: ICD-10-CM

## 2024-11-18 DIAGNOSIS — Z00.00 MEDICARE ANNUAL WELLNESS VISIT, SUBSEQUENT: Primary | ICD-10-CM

## 2024-11-18 DIAGNOSIS — N40.1 BENIGN PROSTATIC HYPERPLASIA WITH NOCTURIA: ICD-10-CM

## 2024-11-18 DIAGNOSIS — R73.03 PREDIABETES: ICD-10-CM

## 2024-11-18 DIAGNOSIS — R35.1 BENIGN PROSTATIC HYPERPLASIA WITH NOCTURIA: ICD-10-CM

## 2024-11-18 DIAGNOSIS — Z79.899 ENCOUNTER FOR LONG-TERM (CURRENT) USE OF HIGH-RISK MEDICATION: ICD-10-CM

## 2024-11-18 DIAGNOSIS — Z12.5 PROSTATE CANCER SCREENING: ICD-10-CM

## 2024-11-18 PROCEDURE — 1126F AMNT PAIN NOTED NONE PRSNT: CPT | Performed by: FAMILY MEDICINE

## 2024-11-18 PROCEDURE — 1159F MED LIST DOCD IN RCRD: CPT | Performed by: FAMILY MEDICINE

## 2024-11-18 PROCEDURE — G0439 PPPS, SUBSEQ VISIT: HCPCS | Performed by: FAMILY MEDICINE

## 2024-11-18 PROCEDURE — 99214 OFFICE O/P EST MOD 30 MIN: CPT | Performed by: FAMILY MEDICINE

## 2024-11-18 PROCEDURE — 1160F RVW MEDS BY RX/DR IN RCRD: CPT | Performed by: FAMILY MEDICINE

## 2024-11-18 PROCEDURE — 1170F FXNL STATUS ASSESSED: CPT | Performed by: FAMILY MEDICINE

## 2024-11-18 RX ORDER — ATORVASTATIN CALCIUM 20 MG/1
20 TABLET, FILM COATED ORAL DAILY
Qty: 90 TABLET | Refills: 3 | Status: SHIPPED | OUTPATIENT
Start: 2024-11-18

## 2024-11-18 NOTE — TELEPHONE ENCOUNTER
Please get notes from Cardiologist and ENT in [place] that he has been seeing    Called [facility] to get records from [facility] fax over

## 2024-11-19 LAB
25(OH)D3+25(OH)D2 SERPL-MCNC: 29 NG/ML (ref 30–100)
ALBUMIN SERPL-MCNC: 4.5 G/DL (ref 3.8–4.8)
ALP SERPL-CCNC: 85 IU/L (ref 44–121)
ALT SERPL-CCNC: 19 IU/L (ref 0–44)
AST SERPL-CCNC: 19 IU/L (ref 0–40)
BASOPHILS # BLD AUTO: 0.1 X10E3/UL (ref 0–0.2)
BASOPHILS NFR BLD AUTO: 1 %
BILIRUB SERPL-MCNC: 1.6 MG/DL (ref 0–1.2)
BUN SERPL-MCNC: 18 MG/DL (ref 8–27)
BUN/CREAT SERPL: 18 (ref 10–24)
CALCIUM SERPL-MCNC: 10.2 MG/DL (ref 8.6–10.2)
CHLORIDE SERPL-SCNC: 102 MMOL/L (ref 96–106)
CHOLEST SERPL-MCNC: 138 MG/DL (ref 100–199)
CO2 SERPL-SCNC: 24 MMOL/L (ref 20–29)
CREAT SERPL-MCNC: 1.01 MG/DL (ref 0.76–1.27)
EGFRCR SERPLBLD CKD-EPI 2021: 76 ML/MIN/1.73
EOSINOPHIL # BLD AUTO: 0.1 X10E3/UL (ref 0–0.4)
EOSINOPHIL NFR BLD AUTO: 2 %
ERYTHROCYTE [DISTWIDTH] IN BLOOD BY AUTOMATED COUNT: 11.2 % (ref 11.6–15.4)
FOLATE SERPL-MCNC: 5.7 NG/ML
GLOBULIN SER CALC-MCNC: 2.3 G/DL (ref 1.5–4.5)
GLUCOSE SERPL-MCNC: 94 MG/DL (ref 70–99)
HBA1C MFR BLD: 6 % (ref 4.8–5.6)
HCT VFR BLD AUTO: 46.9 % (ref 37.5–51)
HDLC SERPL-MCNC: 46 MG/DL
HGB BLD-MCNC: 15.2 G/DL (ref 13–17.7)
IMM GRANULOCYTES # BLD AUTO: 0 X10E3/UL (ref 0–0.1)
IMM GRANULOCYTES NFR BLD AUTO: 0 %
LDLC SERPL CALC-MCNC: 77 MG/DL (ref 0–99)
LYMPHOCYTES # BLD AUTO: 1.7 X10E3/UL (ref 0.7–3.1)
LYMPHOCYTES NFR BLD AUTO: 28 %
MAGNESIUM SERPL-MCNC: 2.3 MG/DL (ref 1.6–2.3)
MCH RBC QN AUTO: 32.3 PG (ref 26.6–33)
MCHC RBC AUTO-ENTMCNC: 32.4 G/DL (ref 31.5–35.7)
MCV RBC AUTO: 100 FL (ref 79–97)
MONOCYTES # BLD AUTO: 0.5 X10E3/UL (ref 0.1–0.9)
MONOCYTES NFR BLD AUTO: 8 %
NEUTROPHILS # BLD AUTO: 3.6 X10E3/UL (ref 1.4–7)
NEUTROPHILS NFR BLD AUTO: 61 %
PLATELET # BLD AUTO: 239 X10E3/UL (ref 150–450)
POTASSIUM SERPL-SCNC: 4.3 MMOL/L (ref 3.5–5.2)
PROT SERPL-MCNC: 6.8 G/DL (ref 6–8.5)
PSA SERPL-MCNC: 3.2 NG/ML (ref 0–4)
PTH-INTACT SERPL-MCNC: 91 PG/ML (ref 15–65)
RBC # BLD AUTO: 4.7 X10E6/UL (ref 4.14–5.8)
SODIUM SERPL-SCNC: 141 MMOL/L (ref 134–144)
TRIGL SERPL-MCNC: 78 MG/DL (ref 0–149)
TSH SERPL DL<=0.005 MIU/L-ACNC: 2.1 UIU/ML (ref 0.45–4.5)
VIT B12 SERPL-MCNC: 1028 PG/ML (ref 232–1245)
VLDLC SERPL CALC-MCNC: 15 MG/DL (ref 5–40)
WBC # BLD AUTO: 6 X10E3/UL (ref 3.4–10.8)

## 2024-11-19 NOTE — ASSESSMENT & PLAN NOTE
Patient will be due for next colonoscopy in April 2025  Orders:    Ambulatory Referral to General Surgery

## 2024-11-19 NOTE — ASSESSMENT & PLAN NOTE
Patient definitely has objective signs of insulin resistance with worsening central obesity in spite of restricted caloric intake and increasing exercise.  He does have some hypoglycemia symptoms if he goes very long without eating.  It therefore seems likely that the patient will progress to having diabetes, but only time will tell.  The patient does not wish to try Wegovy or Zepbound at this time, but says that if he does develop diabetes that he would be willing to try Ozempic or Mounjaro.  Orders:    Hemoglobin A1c

## 2024-11-19 NOTE — PROGRESS NOTES
Subjective   The ABCs of the Annual Wellness Visit  Medicare Wellness Visit      Shaw Rodriguez is a 79 y.o. patient who presents for a Medicare Wellness Visit.    The following portions of the patient's history were reviewed and   updated as appropriate: allergies, current medications, past family history, past medical history, past social history, past surgical history, and problem list.    Compared to one year ago, the patient's physical   health is the same.  Compared to one year ago, the patient's mental   health is the same.    Recent Hospitalizations:  He was not admitted to the hospital during the last year.     Current Medical Providers:  Patient Care Team:  Bakari Monge MD as PCP - General (Family Medicine)  Lois Beth MD (Cardiology)  Otilio Woodard OD (Optometry)  Santiago Vance MD (Internal Medicine)  Isaiah Mane MD as Consulting Physician (General Surgery)    Outpatient Medications Prior to Visit   Medication Sig Dispense Refill    coenzyme Q10 100 MG capsule Take 1 capsule by mouth Daily.      ergocalciferol (ERGOCALCIFEROL) 1.25 MG (31042 UT) capsule Vitamin D2 1,250 mcg (50,000 unit) capsule      vitamin B-12 (CYANOCOBALAMIN) 100 MCG tablet Vitamin B12      atorvastatin (LIPITOR) 20 MG tablet TAKE 1 TABLET BY MOUTH DAILY 90 tablet 0     No facility-administered medications prior to visit.     No opioid medication identified on active medication list. I have reviewed chart for other potential  high risk medication/s and harmful drug interactions in the elderly.      Aspirin is not on active medication list.  Aspirin use is not indicated based on review of current medical condition/s. Risk of harm outweighs potential benefits.  .    Patient Active Problem List   Diagnosis    Mixed hyperlipidemia    Encounter for long-term (current) use of high-risk medication    Tobacco use disorder    Cigarette nicotine dependence without complication    H/O adenomatous polyp of colon     "Primary osteoarthritis involving multiple joints    Pernicious anemia    Benign prostatic hyperplasia with nocturia    Prediabetes    Obstructive sleep apnea    Gynecomastia     Advance Care Planning Advance Directive is not on file.  ACP discussion was held with the patient during this visit. Patient does not have an advance directive, information provided.            Objective   Vitals:    24 1359   BP: 124/62   BP Location: Left arm   Patient Position: Sitting   Cuff Size: Adult   Pulse: 63   SpO2: 95%   Weight: 117 kg (258 lb 6.4 oz)   Height: 182.9 cm (72\")   PainSc: 0-No pain       Estimated body mass index is 35.05 kg/m² as calculated from the following:    Height as of this encounter: 182.9 cm (72\").    Weight as of this encounter: 117 kg (258 lb 6.4 oz).            Does the patient have evidence of cognitive impairment? No                                                                                                Health  Risk Assessment    Smoking Status:  Social History     Tobacco Use   Smoking Status Former    Types: Cigars, Pipe    Quit date:     Years since quittin.8   Smokeless Tobacco Never     Alcohol Consumption:  Social History     Substance and Sexual Activity   Alcohol Use Never       Fall Risk Screen  STEADI Fall Risk Assessment was completed, and patient is at HIGH risk for falls. Assessment completed on:2024    Depression Screening   Little interest or pleasure in doing things? Not at all   Feeling down, depressed, or hopeless? Not at all   PHQ-2 Total Score 0      Health Habits and Functional and Cognitive Screenin/18/2024     2:02 PM   Functional & Cognitive Status   Do you have difficulty preparing food and eating? No   Do you have difficulty bathing yourself, getting dressed or grooming yourself? No   Do you have difficulty using the toilet? No   Do you have difficulty moving around from place to place? No   Do you have trouble with steps or getting out of " a bed or a chair? Yes   Current Diet Well Balanced Diet   Dental Exam Up to date   Eye Exam Up to date   Exercise (times per week) 7 times per week   Current Exercises Include Walking;Yard Work   Do you need help using the phone?  No   Are you deaf or do you have serious difficulty hearing?  Yes   Do you need help to go to places out of walking distance? No   Do you need help shopping? No   Do you need help preparing meals?  No   Do you need help with housework?  Yes   Do you need help with laundry? No   Do you need help taking your medications? No   Do you need help managing money? No   Do you ever drive or ride in a car without wearing a seat belt? No   Have you felt unusual stress, anger or loneliness in the last month? No   Who do you live with? Spouse   If you need help, do you have trouble finding someone available to you? No   Have you been bothered in the last four weeks by sexual problems? No   Do you have difficulty concentrating, remembering or making decisions? No           Age-appropriate Screening Schedule:  Refer to the list below for future screening recommendations based on patient's age, sex and/or medical conditions. Orders for these recommended tests are listed in the plan section. The patient has been provided with a written plan.    Health Maintenance List  Health Maintenance   Topic Date Due    TDAP/TD VACCINES (2 - Tdap) 01/26/2015    LIPID PANEL  09/25/2024    COVID-19 Vaccine (4 - 2024-25 season) 02/07/2025 (Originally 9/1/2024)    ANNUAL WELLNESS VISIT  11/18/2025    BMI FOLLOWUP  11/18/2025    HEPATITIS C SCREENING  Completed    RSV Vaccine - Adults  Completed    INFLUENZA VACCINE  Completed    Pneumococcal Vaccine 65+  Completed    ZOSTER VACCINE  Completed    COLORECTAL CANCER SCREENING  Discontinued                                                                                                                                                CMS Preventative Services Quick  Reference  Risk Factors Identified During Encounter  None Identified    The above risks/problems have been discussed with the patient.  Pertinent information has been shared with the patient in the After Visit Summary.  An After Visit Summary and PPPS were made available to the patient.    Follow Up:   Next Medicare Wellness visit to be scheduled in 1 year.         Additional E&M Note during same encounter follows:  Patient has additional, significant, and separately identifiable condition(s)/problem(s) that require work above and beyond the Medicare Wellness Visit     Chief Complaint  Medicare Wellness-subsequent and Hyperlipidemia    Subjective   HPI  Shaw is also being seen today for additional medical problem/s.  Patient also here for yearly follow-up on hyperlipidemia and other chronic issues.  The patient has been walking regularly and has been able to increase his distance to 1.7 miles a day and was praised for this.  He still does get some shortness of breath with exertion, but that has been fairly stable since he had COVID in December 2021.    The patient has been seeing cardiologist annually for the last few years but was told by the cardiologist that he does not necessarily need to come back for any further visits as his workup was fairly unremarkable.  He also has been going to a sleep medicine specialist in [place] annually for follow-up on his sleep apnea, and he is using his CPAP regularly and benefiting from it.    The patient says that he really tries to watch his dietary intake and limit his calories, along with regular walking, but his weight continues to increase, with central obesity.  He does have prediabetes and we discussed insulin resistance and the tendency to have deposition of visceral fat.  The patient is not interested in any weight loss medicines such as Wegovy or Zepbound at this time.  His mother and brother both have diabetes.    The patient has felt like his hearing has been  decreased lately and would like to have his ears checked for cerumen buildup.  He also has a knot on the right lower lip, laterally that has been stable over the last few months with no pain.    The patient does have a very extensive tobacco use history, and we discussed the fact that unfortunately, Medicare does not cover screening CT of the chest after the age of 77.  I did explain to the patient that if you wish to do the test and pay out-of-pocket, then I will be glad to order the test, and he said he would give that some consideration.    The patient also will be due for colonoscopy in April 2025 as he had 5 polyps removed in April 2022  Review of Systems   Constitutional:  Positive for activity change. Negative for appetite change, chills and fever.   HENT:  Negative for congestion, ear discharge, ear pain, sore throat and trouble swallowing.    Eyes:  Negative for visual disturbance.   Respiratory:  Positive for shortness of breath. Negative for cough, choking, chest tightness, wheezing and stridor.    Cardiovascular:  Negative for chest pain, palpitations and leg swelling.   Gastrointestinal:  Negative for abdominal pain, blood in stool, constipation, diarrhea, nausea and vomiting.   Endocrine: Negative for polydipsia, polyphagia and polyuria.   Genitourinary:  Negative for dysuria and hematuria.   Musculoskeletal:  Negative for arthralgias, back pain, gait problem, joint swelling, myalgias and neck pain.   Skin:  Negative for rash and wound.   Allergic/Immunologic: Positive for environmental allergies.   Neurological:  Negative for tremors, seizures, syncope, facial asymmetry, speech difficulty, weakness, light-headedness and numbness.   Hematological:  Negative for adenopathy.   Psychiatric/Behavioral:  Negative for dysphoric mood. The patient is not nervous/anxious.               Objective   Vital Signs:  /62 (BP Location: Left arm, Patient Position: Sitting, Cuff Size: Adult)   Pulse 63   Ht  "182.9 cm (72\")   Wt 117 kg (258 lb 6.4 oz)   SpO2 95%   BMI 35.05 kg/m²   Physical Exam  Constitutional:       General: He is not in acute distress.     Appearance: He is obese. He is not toxic-appearing.   HENT:      Head: Normocephalic and atraumatic.      Right Ear: External ear normal. There is impacted cerumen.      Left Ear: External ear normal. There is impacted cerumen.      Nose: Nose normal. No rhinorrhea.      Mouth/Throat:      Mouth: Mucous membranes are moist.      Pharynx: Oropharynx is clear. No posterior oropharyngeal erythema.   Eyes:      General: No scleral icterus.     Extraocular Movements: Extraocular movements intact.      Conjunctiva/sclera: Conjunctivae normal.      Pupils: Pupils are equal, round, and reactive to light.   Neck:      Vascular: No carotid bruit.   Cardiovascular:      Rate and Rhythm: Normal rate and regular rhythm.      Pulses: Normal pulses.      Heart sounds: Normal heart sounds. No murmur heard.     No gallop.   Pulmonary:      Effort: Pulmonary effort is normal.      Breath sounds: Normal breath sounds. No wheezing or rales.   Chest:      Chest wall: No tenderness.   Abdominal:      General: Bowel sounds are normal. There is no distension.      Palpations: Abdomen is soft. There is no mass.      Tenderness: There is no abdominal tenderness. There is no guarding or rebound.   Musculoskeletal:         General: No swelling or deformity. Normal range of motion.      Cervical back: Normal range of motion. No rigidity.      Right lower leg: No edema.      Left lower leg: No edema.   Lymphadenopathy:      Cervical: No cervical adenopathy.   Skin:     General: Skin is warm and dry.      Capillary Refill: Capillary refill takes less than 2 seconds.      Coloration: Skin is not jaundiced.      Findings: Lesion (Patient does appear to have a mucous retention cyst on her right lower lateral lip about 6 to 8 mm with a varicosity inside the mouth, but smooth and nontender) " present. No bruising, erythema or rash.   Neurological:      General: No focal deficit present.      Mental Status: He is alert and oriented to person, place, and time.      Cranial Nerves: No cranial nerve deficit.      Coordination: Coordination normal.      Gait: Gait normal.   Psychiatric:         Mood and Affect: Mood normal.         Behavior: Behavior normal.         Judgment: Judgment normal.                       Assessment and Plan            Medicare annual wellness visit, subsequent         Mixed hyperlipidemia     Patient will continue current medications and we will check labs  Orders:    Lipid Panel    Encounter for long-term (current) use of high-risk medication    Orders:    CBC & Differential    Comprehensive Metabolic Panel    Magnesium    Prediabetes  Patient definitely has objective signs of insulin resistance with worsening central obesity in spite of restricted caloric intake and increasing exercise.  He does have some hypoglycemia symptoms if he goes very long without eating.  It therefore seems likely that the patient will progress to having diabetes, but only time will tell.  The patient does not wish to try Wegovy or Zepbound at this time, but says that if he does develop diabetes that he would be willing to try Ozempic or Mounjaro.  Orders:    Hemoglobin A1c    Obstructive sleep apnea  Patient is using his CPAP and benefits from this       Pernicious anemia    Orders:    Folate    Vitamin B12    Tobacco use disorder         Primary osteoarthritis involving multiple joints         Benign prostatic hyperplasia with nocturia         H/O adenomatous polyp of colon  Patient will be due for next colonoscopy in April 2025  Orders:    Ambulatory Referral to General Surgery    Vitamin D insufficiency    Orders:    Vitamin D,25-Hydroxy    Hypercalcemia  This has been mild but noted with the last 2 sets of lab work so I will recheck and check a PTH as well  Orders:    TSH    PTH, Intact    Prostate  cancer screening  Patient does wish to continue being screened even though he is 79 years of age.  He did have a mildly elevated PSA a year ago, but we rechecked in a few months and it came down.  Pros and cons of prostate cancer screening in someone of his age have been discussed on multiple occasions.  Orders:    PSA Screen    Bilateral impacted cerumen  The medical assistant irrigated both ears until clear  Orders:    Ear Cerumen Removal            Follow Up   Return in about 1 year (around 11/18/2025) for Recheck, Nurse - AWV Subsequent.  Patient was given instructions and counseling regarding his condition or for health maintenance advice. Please see specific information pulled into the AVS if appropriate.

## 2024-11-20 NOTE — TELEPHONE ENCOUNTER
Please get notes from Cardiologist and ENT in [place] that he has been seeing     Pt had not seen ENT was Dr. Vance [facility] sleep center Aug 20,2024   Faxing home sleep study with note from Dr. Vance  11/20/2024 09:45 am

## 2024-11-20 NOTE — TELEPHONE ENCOUNTER
Please get notes from Cardiologist and ENT in [place] that he has been seeing      Pt had not seen ENT was Dr. Vance [facility] sleep center Aug 20,2024   Faxing home sleep study with note from Dr. Vance  11/20/2024 09:45 am       Notes for review in to do box

## 2024-11-21 ENCOUNTER — TELEPHONE (OUTPATIENT)
Dept: FAMILY MEDICINE CLINIC | Facility: CLINIC | Age: 79
End: 2024-11-21
Payer: MEDICARE

## 2024-11-21 NOTE — TELEPHONE ENCOUNTER
Tayler, pt's medicine list from 11/18/2024 says he is taking 50,000 IU of vitamin D. Can you please find out who is prescribing this? If it's an error, please let me know, it pertains to his recent abnormal labs.

## 2024-11-22 ENCOUNTER — TELEPHONE (OUTPATIENT)
Dept: FAMILY MEDICINE CLINIC | Facility: CLINIC | Age: 79
End: 2024-11-22
Payer: MEDICARE

## 2024-11-22 DIAGNOSIS — E83.52 HYPERCALCEMIA: Primary | ICD-10-CM

## 2024-11-22 DIAGNOSIS — E55.9 VITAMIN D DEFICIENCY: ICD-10-CM

## 2024-11-22 RX ORDER — CHOLECALCIFEROL (VITAMIN D3) 25 MCG
1000 TABLET ORAL DAILY
Qty: 1 TABLET | Refills: 0 | Status: SHIPPED | OUTPATIENT
Start: 2024-11-22

## 2024-11-22 NOTE — TELEPHONE ENCOUNTER
Tayler, pt's medicine list from 11/18/2024 says he is taking 50,000 IU of vitamin D. Can you please find out who is prescribing this? If it's an error, please let me know, it pertains to his recent abnormal labs.          Note          Dr. Monge pt is taking otc Vitamin D 1,000 units one po daily 11/22/2024 03:49 pm

## 2024-11-22 NOTE — TELEPHONE ENCOUNTER
I sent pt letter about his labs and the elevated PTH (parathyroid hormone) level. I would like for him to buy and take 5,000 IU of vitamin D3 every day JUST FOR THE NEXT 3 WEEKS--he should not stay on that dose indefinitely. Then I want him to come and have the PTH level repeated, and I will leave orders in his chart, so he just needs to schedule a time to have the blood drawn. Schedule the blood draw for sometime the week of December 16th, preferably before Friday.

## 2024-11-25 ENCOUNTER — TELEPHONE (OUTPATIENT)
Dept: FAMILY MEDICINE CLINIC | Facility: CLINIC | Age: 79
End: 2024-11-25

## 2024-11-25 NOTE — TELEPHONE ENCOUNTER
sent pt letter about his labs and the elevated PTH (parathyroid hormone) level. I would like for him to buy and take 5,000 IU of vitamin D3 every day JUST FOR THE NEXT 3 WEEKS--he should not stay on that dose indefinitely. Then I want him to come and have the PTH level repeated, and I will leave orders in his chart, so he just needs to schedule a time to have the blood drawn. Schedule the blood draw for sometime the week of December 16th, preferably before Friday.     Left  11/25/2024 4:04 pm

## 2024-11-25 NOTE — TELEPHONE ENCOUNTER
Caller: Alban Rodriguez    Relationship: Self    Best call back number: [phone]     What is the best time to reach you: ANY    Who are you requesting to speak with (clinical staff, provider,  specific staff member): DR HARPER    Do you know the name of the person who called: ALBAN    What was the call regarding: PT WAS CALLING OFFICE TO SPEAK WITH PCP AS INSTRUCTED.

## 2024-12-17 ENCOUNTER — LAB (OUTPATIENT)
Dept: FAMILY MEDICINE CLINIC | Facility: CLINIC | Age: 79
End: 2024-12-17
Payer: MEDICARE

## 2024-12-18 DIAGNOSIS — E21.3 HYPERPARATHYROIDISM: Primary | ICD-10-CM

## 2024-12-23 ENCOUNTER — TELEPHONE (OUTPATIENT)
Dept: FAMILY MEDICINE CLINIC | Facility: CLINIC | Age: 79
End: 2024-12-23
Payer: MEDICARE

## 2024-12-30 NOTE — TELEPHONE ENCOUNTER
ATTEMPTED TO REACH PATIENT TO DISCUSS DIFFERENT OPTIONS FOR HIS ENDOCRINOLOGY REFERRAL. LEFT VM FOR HIM TO RETURN MY CALL.

## 2025-03-19 ENCOUNTER — OFFICE VISIT (OUTPATIENT)
Dept: ENDOCRINOLOGY | Facility: CLINIC | Age: 80
End: 2025-03-19
Payer: MEDICARE

## 2025-03-19 VITALS
SYSTOLIC BLOOD PRESSURE: 138 MMHG | HEART RATE: 58 BPM | BODY MASS INDEX: 34.54 KG/M2 | WEIGHT: 255 LBS | HEIGHT: 72 IN | DIASTOLIC BLOOD PRESSURE: 78 MMHG

## 2025-03-19 DIAGNOSIS — E55.9 VITAMIN D DEFICIENCY: ICD-10-CM

## 2025-03-19 DIAGNOSIS — E21.3 HYPERPARATHYROIDISM: Primary | ICD-10-CM

## 2025-03-19 LAB
25(OH)D3 SERPL-MCNC: 34.9 NG/ML (ref 30–100)
ALBUMIN SERPL-MCNC: 4.3 G/DL (ref 3.5–5.2)
ALBUMIN/GLOB SERPL: 1.7 G/DL
ALP SERPL-CCNC: 89 U/L (ref 39–117)
ALT SERPL W P-5'-P-CCNC: 24 U/L (ref 1–41)
ANION GAP SERPL CALCULATED.3IONS-SCNC: 10 MMOL/L (ref 5–15)
AST SERPL-CCNC: 25 U/L (ref 1–40)
BILIRUB SERPL-MCNC: 1.5 MG/DL (ref 0–1.2)
BUN SERPL-MCNC: 19 MG/DL (ref 8–23)
BUN/CREAT SERPL: 17.3 (ref 7–25)
CALCIUM SPEC-SCNC: 10.6 MG/DL (ref 8.6–10.5)
CHLORIDE SERPL-SCNC: 105 MMOL/L (ref 98–107)
CO2 SERPL-SCNC: 23 MMOL/L (ref 22–29)
CREAT SERPL-MCNC: 1.1 MG/DL (ref 0.76–1.27)
EGFRCR SERPLBLD CKD-EPI 2021: 68.7 ML/MIN/1.73
GLOBULIN UR ELPH-MCNC: 2.5 GM/DL
GLUCOSE SERPL-MCNC: 82 MG/DL (ref 65–99)
POTASSIUM SERPL-SCNC: 4.2 MMOL/L (ref 3.5–5.2)
PROT SERPL-MCNC: 6.8 G/DL (ref 6–8.5)
PTH-INTACT SERPL-MCNC: 97.9 PG/ML (ref 15–65)
SODIUM SERPL-SCNC: 138 MMOL/L (ref 136–145)

## 2025-03-19 PROCEDURE — 36415 COLL VENOUS BLD VENIPUNCTURE: CPT | Performed by: INTERNAL MEDICINE

## 2025-03-19 PROCEDURE — 99204 OFFICE O/P NEW MOD 45 MIN: CPT | Performed by: INTERNAL MEDICINE

## 2025-03-19 PROCEDURE — 80053 COMPREHEN METABOLIC PANEL: CPT | Performed by: INTERNAL MEDICINE

## 2025-03-19 PROCEDURE — 82306 VITAMIN D 25 HYDROXY: CPT | Performed by: INTERNAL MEDICINE

## 2025-03-19 PROCEDURE — 83970 ASSAY OF PARATHORMONE: CPT | Performed by: INTERNAL MEDICINE

## 2025-03-19 NOTE — PROGRESS NOTES
Chief Complaint   Patient presents with    hyperparathyroidism        New patient who is being seen in consultation regarding hyperparathyroidism at the request of Bakari Monge MD     HPI   Shaw Rodriguez is a 79 y.o. male who presents for evaluation of hyperparathyroidism.    Patient reports that high calcium was initially noted on routine labs.  Follow-up evaluation noted abnormal PTH, vitamin D.  Patient reports that he transiently took a higher dose of vitamin D but more recently has received his usual dose of 1000 international units daily.  He has never had a DEXA scan.  He denies any history of kidney dysfunction, kidney stones.  He is not taking any calcium supplementation.    Past Medical History:   Diagnosis Date    Actinic keratosis     Allergic rhinitis     Appendicitis     B12 deficiency     Basal cell carcinoma     Colon polyp     Cystic dysplasia of one kidney     LARGE RIGHT SIDE FOLLOWED BY UROLO FOR THIS    Degenerative joint disease involving multiple joints     Hepatitis B     DISTANT PAST    Hyperplastic colon polyp 2005    Mixed hyperlipidemia     Nasal polyps     FLONASE NASAL SPRAY    Nicotine dependence     Obstructive sleep apnea     On long term drug therapy     Onychomycosis     Phlebitis 2004    R LEG, CELLULITIS, HOSPITALIZED    Prediabetes     Right medial knee pain     Thromboembolic EVENT (HCC)     Tinea pedis     Varicose veins of both lower extremities     right greater than left    Vitamin D deficiency      Past Surgical History:   Procedure Laterality Date    APPENDECTOMY      BASAL CELL CARCINOMA EXCISION      FOREHEAD    BREAST FIBROADENOMA SURGERY      SUCCESSFUL    COLONOSCOPY  2005    HYPERPLASTIC RECTAL POLYP    COLONOSCOPY W/ BIOPSIES AND POLYPECTOMY      2022, 5 polypectomy, diverticulosis mild    HERNIA REPAIR Left     INGUINAL    OTHER SURGICAL HISTORY      CRYOSURGERY    POLYPECTOMY  08/2017    COLON POLYPS W/ C SCOPE SESSILE SERRATED ADENOMA X2 FOWWOWUP BE DONE AS  "SCOPE WAS INCOMPLETE, BE WAS NORMAL EXC DIVERTICULOSIS    VASECTOMY        Family History   Problem Relation Age of Onset    Coronary artery disease Mother     Diabetes Mother     Hypertension Mother     Peripheral vascular disease Mother     Alzheimer's disease Mother     Heart attack Father     Hyperlipidemia Brother     Asthma Daughter       Social History     Socioeconomic History    Marital status:    Tobacco Use    Smoking status: Former     Types: Cigars, Pipe     Quit date:      Years since quittin.2    Smokeless tobacco: Never   Vaping Use    Vaping status: Never Used   Substance and Sexual Activity    Alcohol use: Never    Drug use: Never    Sexual activity: Defer      No Known Allergies   Current Outpatient Medications on File Prior to Visit   Medication Sig Dispense Refill    atorvastatin (LIPITOR) 20 MG tablet Take 1 tablet by mouth Daily. 90 tablet 3    cholecalciferol (Vitamin D) 25 MCG (1000 UT) tablet Take 1 tablet by mouth Daily. 1 tablet 0    coenzyme Q10 100 MG capsule Take 1 capsule by mouth Daily.      vitamin B-12 (CYANOCOBALAMIN) 100 MCG tablet Vitamin B12       No current facility-administered medications on file prior to visit.        Review of Systems   Constitutional:  Negative for fatigue.   HENT:  Positive for rhinorrhea, sneezing and tinnitus.    Respiratory:  Positive for shortness of breath.    Gastrointestinal:  Negative for constipation.   Endocrine: Positive for polyuria.   Genitourinary:  Positive for difficulty urinating.   Musculoskeletal:  Positive for arthralgias.   Neurological:  Positive for headache.   Hematological:  Bruises/bleeds easily.          Vitals:    25 1301   BP: 138/78   Pulse: 58   Weight: 116 kg (255 lb)   Height: 182.9 cm (72.01\")   Body mass index is 34.58 kg/m².     Physical Exam  Vitals reviewed.   Cardiovascular:      Rate and Rhythm: Normal rate and regular rhythm.   Pulmonary:      Effort: Pulmonary effort is normal.      Breath " sounds: Normal breath sounds.   Neurological:      Mental Status: He is alert.   Psychiatric:         Mood and Affect: Mood and affect normal.          Labs/Imaging   Latest Reference Range & Units 06/30/22 13:01 09/25/23 14:41 01/11/24 12:39 11/18/24 15:24 12/17/24 10:16   Sodium 134 - 144 mmol/L 141 142 140 141 139   Potassium 3.5 - 5.2 mmol/L 4.6 4.4 5.3 (H) 4.3 4.6   Chloride 96 - 106 mmol/L 106 104 103 102 104   CO2 20 - 29 mmol/L 23 24 24 24 24   BUN 8 - 27 mg/dL 13 17 21 18 20   Creatinine 0.76 - 1.27 mg/dL 1.06 1.05 1.17 1.01 1.09   BUN/Creatinine Ratio 10 - 24  12 16 18 18 18   EGFR Result >59 mL/min/1.73 73 74 64 76 69   Glucose 70 - 99 mg/dL 86 84 133 (H) 94 91   Calcium 8.6 - 10.2 mg/dL 10.2 10.4 (H) 10.6 (H) 10.2 10.5 (H)   Magnesium 1.6 - 2.3 mg/dL    2.3    Alkaline Phosphatase 44 - 121 IU/L 95 81 91 85    Total Protein 6.0 - 8.5 g/dL 6.5 6.5 6.7 6.8    Albumin 3.8 - 4.8 g/dL 4.1 4.3 4.4 4.5    A/G Ratio 1.2 - 2.2  1.7 2.0 1.9     AST (SGOT) 0 - 40 IU/L 19 24 30 19    ALT (SGPT) 0 - 44 IU/L 23 31 21 19    Total Bilirubin 0.0 - 1.2 mg/dL 1.0 1.4 (H) 1.5 (H) 1.6 (H)    Hemoglobin A1C 4.8 - 5.6 % 5.8 (H) 6.0 (H)  6.0 (H)    PTH Intact (Serial Monitor) 15 - 65 pg/mL    91 (H) 70 (H)   TSH Baseline 0.450 - 4.500 uIU/mL 2.510 1.580  2.100    Free T4 0.82 - 1.77 ng/dL  1.47      (H): Data is abnormally high    Assessment and Plan    Diagnoses and all orders for this visit:    1. Hyperparathyroidism (Primary)  -     DEXA Bone Density Axial; Future  -     Comprehensive Metabolic Panel; Future  -     PTH, Intact; Future  -     Vitamin D,25-Hydroxy; Future    2. Vitamin D deficiency    Labs were obtained to investigate hyperparathyroidism given hypercalcemia on labs.  Hypercalcemia was initially noted on routine laboratory evaluation.  This historically has been intermittent, peak value of 10.6.  Most recent calcium value 10.5.  Patient does have known vitamin D deficiency, most recent level was normal.  He is  currently taking 1000 international units daily.  I reviewed impact of vitamin D on PTH.  Discussed symptoms associated with hypercalcemia and potential complications of hypercalcemia such as abnormal kidney function, altered mentation, cardiac arrhythmia.  We discussed evaluation that would be required to confirm primary hyperparathyroidism and exclude FHH.  We also discussed that definitive management of hyperparathyroidism is surgical.  Patient has never had a bone density scan, this was ordered today.  If bone density is normal, patient does not have an absolute indication for surgery.  Patient reports that unless this is abnormal he would prefer intermittent monitoring at this time.  Given preference to avoid surgery, will defer 24-hour urine calcium at this time.    Return for June 2026. The patient was instructed to contact the clinic with any interval questions or concerns.    Electronically signed by: Key Davalos MD     Dictated Utilizing Dragon Dictation

## 2025-07-09 ENCOUNTER — HOSPITAL ENCOUNTER (OUTPATIENT)
Dept: BONE DENSITY | Facility: HOSPITAL | Age: 80
Discharge: HOME | End: 2025-07-09
Admitting: INTERNAL MEDICINE
Payer: MEDICARE

## 2025-07-09 DIAGNOSIS — E21.3 HYPERPARATHYROIDISM: ICD-10-CM

## 2025-07-09 PROCEDURE — 77080 DXA BONE DENSITY AXIAL: CPT

## 2025-07-18 ENCOUNTER — LAB (OUTPATIENT)
Dept: ENDOCRINOLOGY | Facility: CLINIC | Age: 80
End: 2025-07-18
Payer: MEDICARE

## 2025-07-18 DIAGNOSIS — E21.3 HYPERPARATHYROIDISM: ICD-10-CM

## 2025-07-18 LAB
CALCIUM 24H UR-MCNC: 9.3 MG/DL
CALCIUM 24H UR-MRATE: 241.8 MG/24 HR (ref 100–300)
COLLECT DURATION TIME UR: 24 HRS
SPECIMEN VOL 24H UR: 2600 ML

## 2025-07-18 PROCEDURE — 81050 URINALYSIS VOLUME MEASURE: CPT | Performed by: INTERNAL MEDICINE

## 2025-07-18 PROCEDURE — 82570 ASSAY OF URINE CREATININE: CPT | Performed by: INTERNAL MEDICINE

## 2025-07-18 PROCEDURE — 82340 ASSAY OF CALCIUM IN URINE: CPT | Performed by: INTERNAL MEDICINE

## 2025-07-19 LAB
COLLECT DURATION TIME UR: 24 HRS
CREAT UR-MCNC: 50.8 MG/DL
CREATINE 24H UR-MRATE: 1.32 G/24 HR (ref 1–2.4)
SPECIMEN VOL 24H UR: 2600 ML

## 2025-07-29 DIAGNOSIS — E21.3 HYPERPARATHYROIDISM: Primary | ICD-10-CM
